# Patient Record
(demographics unavailable — no encounter records)

---

## 2025-05-20 NOTE — HISTORY OF PRESENT ILLNESS
[FreeTextEntry1] : 49 y.o. 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 -5, pt received 3u PRBC's,  followed up with Hematology and was told to f/u with GYN. pt denies any rectal bleeding, pt has sono in ED and CT scan of Abd/pelvis which revealed myomata, pt had MRI in July of last year which showed multiple myomata with submucous component.  Last colonoscopy Feb. 2022,  next due in 2027.

## 2025-05-20 NOTE — DISCUSSION/SUMMARY
[FreeTextEntry1] : A - WWV      menorrhagia with anemia      uterine myomata      r/o female climacteric state      HTN  P- referral to [facility] for surgical consultation     pap done     CBC, FSH LH, TSH- done      pt hai restrepo      nutritional counseling provided,      pt will obtain record of CT scan and pelvic sono form ED at [facility].